# Patient Record
Sex: FEMALE | Race: WHITE
[De-identification: names, ages, dates, MRNs, and addresses within clinical notes are randomized per-mention and may not be internally consistent; named-entity substitution may affect disease eponyms.]

---

## 2020-10-23 ENCOUNTER — HOSPITAL ENCOUNTER (OUTPATIENT)
Dept: HOSPITAL 76 - DI | Age: 50
Discharge: HOME | End: 2020-10-23
Attending: STUDENT IN AN ORGANIZED HEALTH CARE EDUCATION/TRAINING PROGRAM
Payer: COMMERCIAL

## 2020-10-23 DIAGNOSIS — M19.012: Primary | ICD-10-CM

## 2020-10-23 DIAGNOSIS — M25.612: ICD-10-CM

## 2020-10-23 NOTE — XRAY REPORT
PROCEDURE:  Shoulder 3 View LT

 

INDICATIONS:  LT SHLDR PAIN, DECREASED ROM

 

TECHNIQUE:  3 views of the shoulder were acquired.  

 

COMPARISON:  None.

 

FINDINGS:  

 

Bones:  No fractures or dislocations.  Mild to moderate acromioclavicular joint and glenohumeral join
t osteoarthritic changes are seen. No suspicious bony lesions.  Visualized ribs appear intact.  

 

Soft tissues:  No suspicious soft tissue calcifications.  

 

IMPRESSION:  Mild to moderate left shoulder joint osteoarthritis. No fracture or dislocation.

 

Reviewed by: Andrea Branch MD on 10/23/2020 9:47 AM VEE

Approved by: Andrea Branch MD on 10/23/2020 9:47 AM AKLAUREN

 

 

Station ID:  [id number]

## 2020-11-24 ENCOUNTER — HOSPITAL ENCOUNTER (OUTPATIENT)
Dept: HOSPITAL 76 - COV | Age: 50
Discharge: HOME | End: 2020-11-24
Attending: FAMILY MEDICINE
Payer: COMMERCIAL

## 2020-11-24 DIAGNOSIS — Z20.828: Primary | ICD-10-CM

## 2020-12-22 ENCOUNTER — HOSPITAL ENCOUNTER (OUTPATIENT)
Dept: HOSPITAL 76 - DI.N | Age: 50
Discharge: HOME | End: 2020-12-22
Attending: STUDENT IN AN ORGANIZED HEALTH CARE EDUCATION/TRAINING PROGRAM
Payer: COMMERCIAL

## 2020-12-22 DIAGNOSIS — Z12.31: Primary | ICD-10-CM

## 2020-12-22 PROCEDURE — 77067 SCR MAMMO BI INCL CAD: CPT

## 2020-12-23 NOTE — MAMMOGRAPHY REPORT
BILATERAL DIGITAL SCREENING MAMMOGRAM 3D/2D: 12/22/2020

 

CLINICAL: Routine screening.  

 

Comparison is made to exams dated:  2/21/2017 mammogram, 9/2/2015 mammogram, 9/2/2015 ultrasound, 8/2
6/2015 mammogram, 8/26/2015 ultrasound, and 8/14/2015 mammogram - [facility].  Ther
e are scattered fibroglandular elements in both breasts.  

 

There is a stable benign focal asymmetry in the right breast.  There also is a biopsy clip in the lef
t breast.  

No significant masses, calcifications, or other findings are seen in either breast.  

There has been no significant interval change.

 

IMPRESSION: BENIGN

There is no mammographic evidence of malignancy. A 1 year screening mammogram is recommended.  

 

 

This exam was interpreted at Station ID: [id number].  

 

NOTE: For mammograms, a report in lay terms will be sent to the patient. Approximately 15% of breast 
malignancies will not be visualized mammographically. In the management of a palpable breast mass, a 
negative mammogram must not discourage biopsy of a clinically suspicious lesion.

 

Electronically Signed By: Nasir Macedo          

acr/penrad:12/22/2020 11:41:15  

 

 

 

ACR BI-RADS Category 2: Benign Finding(s) 3342F

PARENCHYMAL PATTERN: (A) - The breast(s) demonstrate(s) scattered fibroglandular densities.

BI-RADS CATEGORY: (2) - 2

RECOMMENDATION: (ANNUAL)  - Recommend routine annual screening mammography.

[id number]

1 year screening

LATERALITY: (B)